# Patient Record
Sex: MALE | Race: WHITE
[De-identification: names, ages, dates, MRNs, and addresses within clinical notes are randomized per-mention and may not be internally consistent; named-entity substitution may affect disease eponyms.]

---

## 2022-11-15 ENCOUNTER — HOSPITAL ENCOUNTER (EMERGENCY)
Dept: HOSPITAL 97 - ER | Age: 27
Discharge: HOME | End: 2022-11-15
Payer: COMMERCIAL

## 2022-11-15 VITALS — TEMPERATURE: 97.9 F

## 2022-11-15 VITALS — OXYGEN SATURATION: 100 % | DIASTOLIC BLOOD PRESSURE: 76 MMHG | SYSTOLIC BLOOD PRESSURE: 127 MMHG

## 2022-11-15 DIAGNOSIS — F17.210: ICD-10-CM

## 2022-11-15 DIAGNOSIS — R07.89: Primary | ICD-10-CM

## 2022-11-15 DIAGNOSIS — R06.02: ICD-10-CM

## 2022-11-15 LAB
BUN BLD-MCNC: 15 MG/DL (ref 7–18)
GLUCOSE SERPLBLD-MCNC: 85 MG/DL (ref 74–106)
HCT VFR BLD CALC: 47.5 % (ref 39.6–49)
LYMPHOCYTES # SPEC AUTO: 1.3 K/UL (ref 0.7–4.9)
MCV RBC: 91.1 FL (ref 80–100)
PMV BLD: 9.1 FL (ref 7.6–11.3)
POTASSIUM SERPL-SCNC: 3.8 MMOL/L (ref 3.5–5.1)
RBC # BLD: 5.21 M/UL (ref 4.33–5.43)
TROPONIN I SERPL HS-MCNC: 6 PG/ML (ref ?–58.9)

## 2022-11-15 PROCEDURE — 96374 THER/PROPH/DIAG INJ IV PUSH: CPT

## 2022-11-15 PROCEDURE — 85025 COMPLETE CBC W/AUTO DIFF WBC: CPT

## 2022-11-15 PROCEDURE — 84484 ASSAY OF TROPONIN QUANT: CPT

## 2022-11-15 PROCEDURE — 36415 COLL VENOUS BLD VENIPUNCTURE: CPT

## 2022-11-15 PROCEDURE — 93005 ELECTROCARDIOGRAM TRACING: CPT

## 2022-11-15 PROCEDURE — 80048 BASIC METABOLIC PNL TOTAL CA: CPT

## 2022-11-15 PROCEDURE — 85379 FIBRIN DEGRADATION QUANT: CPT

## 2022-11-15 PROCEDURE — 99285 EMERGENCY DEPT VISIT HI MDM: CPT

## 2022-11-15 PROCEDURE — 71045 X-RAY EXAM CHEST 1 VIEW: CPT

## 2022-11-15 NOTE — ER
Nurse's Notes                                                                                     

 [facility]                                                                 

Name: Devon Bernal                                                                                

Age: 26 yrs                                                                                       

Sex: Male                                                                                         

: 1995                                                                                   

MRN: [id number]                                                                                   

Arrival Date: 11/15/2022                                                                          

Time: 10:30                                                                                       

Account#: [id number]                                                                            

Bed 9                                                                                             

Private MD:                                                                                       

Diagnosis: Chest pain, unspecified                                                                

                                                                                                  

Presentation:                                                                                     

11/15                                                                                             

10:48 Chief complaint: Patient states: Mid sternal, sharp, CP started this morning, worse     jl7 

      with movement and taking a deep breath. Coronavirus screen: At this time, the client        

      does not indicate any symptoms associated with coronavirus-19. Ebola Screen: No             

      symptoms or risks identified at this time. Initial Sepsis Screen: Does the patient meet     

      any 2 criteria? No. Patient's initial sepsis screen is negative. Does the patient have      

      a suspected source of infection? No. Patient's initial sepsis screen is negative. Risk      

      Assessment: Do you want to hurt yourself or someone else? Patient reports no desire to      

      harm self or others. Onset of symptoms was November 15, 2022.                               

10:48 Method Of Arrival: Ambulatory                                                           7 

10:48 Acuity: JAGJIT 3                                                                           jl7 

                                                                                                  

Triage Assessment:                                                                                

10:49 General: Appears in no apparent distress. uncomfortable, Behavior is calm, cooperative, jl7 

      appropriate for age. Pain: Complains of pain in mid-sternal area Pain currently is 6        

      out of 10 on a pain scale. at worst was 9 out of 10 on a pain scale. Cardiovascular:        

      Patient's skin is warm and dry.                                                             

                                                                                                  

Historical:                                                                                       

- Allergies:                                                                                      

10:49 No Known Allergies;                                                                     jl7 

- Home Meds:                                                                                      

10:49 None [Active];                                                                          jl7 

- PMHx:                                                                                           

10:49 None;                                                                                   jl7 

- PSHx:                                                                                           

10:49 None;                                                                                   jl7 

                                                                                                  

- Immunization history:: Client reports having NOT received the Covid vaccine.                    

- Social history:: Smoking status: Patient reports the use of cigarette tobacco                   

  products, denies chronic smoking, but will smoke occasionally.                                  

                                                                                                  

                                                                                                  

Screenin:10 Abuse screen: Denies threats or abuse. Nutritional screening: No deficits noted.        ap3 

      Tuberculosis screening: No symptoms or risk factors identified. Fall Risk None              

      identified.                                                                                 

                                                                                                  

Assessment:                                                                                       

13:39 General: Appears in no apparent distress. comfortable, Behavior is calm, cooperative,   ld1 

      appropriate for age. Pain: Denies pain. Neuro: Level of Consciousness is awake, alert,      

      obeys commands, Oriented to person, place, time, situation. Cardiovascular: Capillary       

      refill < 3 seconds Patient's skin is warm and dry. Respiratory: Airway is patent            

      Respiratory effort is even, unlabored. GI: Abdomen is flat, non-distended.                  

14:48 Reassessment: Pt c/o pain in back - notified ERP. See MAR for new orders.               ld1 

                                                                                                  

Vital Signs:                                                                                      

10:48  / 79; Pulse 73; Resp 17; Temp 97.9; Pulse Ox 99% ; Weight 79.38 kg; Height 6 ft. jl7 

      (182.88 cm); Pain 6/10;                                                                     

13:39  / 81; Pulse 74; Resp 18; Pulse Ox 99% on R/A;                                    ld1 

14:48  / 76; Pulse 71; Resp 18; Pulse Ox 100% on R/A; Pain 7/10;                        ld1 

10:48 Body Mass Index 23.73 (79.38 kg, 182.88 cm)                                             jl7 

                                                                                                  

ED Course:                                                                                        

10:30 Patient arrived in ED.                                                                  mr  

10:30 Jaswant Paris PA is PHCP.                                                              jmm 

10:30 Duglas Olguin MD is Attending Physician.                                                jmm 

10:48 Rosaura London, RN is Primary Nurse.                                                   iw  

10:49 Triage completed.                                                                       jl7 

10:49 Arm band placed on right wrist.                                                         jl7 

11:07 Inserted saline lock: 20 gauge in right antecubital area, using aseptic technique.      ap3 

      Blood collected.                                                                            

11:11 Placed in gown. Bed in low position. Side rails up X 1. Cardiac monitor on. Pulse ox    ap3 

      on. NIBP on.                                                                                

11:11 No provider procedures requiring assistance completed. Patient maintains SpO2           ap3 

      saturation greater than 95% on room air.                                                    

11:21 XRAY Chest (1 view) In Process Unspecified.                                             EDMS

14:47 Troponin High Sensitivity: Draw at 2 pm Sent.                                           ld1 

15:15 Thad Torres MD is Referral Physician.                                               jmm 

15:24 IV discontinued, intact, bleeding controlled, No redness/swelling at site.              ld1 

                                                                                                  

Administered Medications:                                                                         

14:47 Drug: Ketorolac 30 mg Route: IVP; Site: right antecubital;                              ld1 

                                                                                                  

                                                                                                  

Medication:                                                                                       

11:11 VIS not applicable for this client.                                                     ap3 

                                                                                                  

Outcome:                                                                                          

15:16 Discharge ordered by MD.                                                                jmm 

15:23 Discharged to home ambulatory.                                                          ld1 

15:23 Condition: stable                                                                           

15:23 Discharge instructions given to patient, Instructed on discharge instructions, follow       

      up and referral plans. medication usage, Demonstrated understanding of instructions,        

      follow-up care, medications, Prescriptions given X 3.                                       

15:24 Patient left the ED.                                                                    ld1 

                                                                                                  

Signatures:                                                                                       

Dispatcher [employer]                           EDMS                                                 

Jaswant Paris PA PA jmm Rivera, Mary                                 mr                                                   

Rosaura London RN RN iw Leal, Jahala, RN                        RN   jl7                                                  

Radha Batres RN RN   ap3                                                  

Yenifer Correia RN                     RN   ld1                                                  

                                                                                                  

**************************************************************************************************

## 2022-11-15 NOTE — EKG
Test Date:    2022-11-15               Test Time:    10:53:39

Technician:   ALP                                    

                                                     

MEASUREMENT RESULTS:                                       

Intervals:                                           

Rate:         58                                     

KS:           168                                    

QRSD:         86                                     

QT:           360                                    

QTc:          353                                    

Axis:                                                

P:            58                                     

KS:           168                                    

QRS:          85                                     

T:            74                                     

                                                     

INTERPRETIVE STATEMENTS:                                       

                                                     

Sinus bradycardia

Otherwise normal ECG

Compared to ECG 08/06/2013 10:17:35

Sinus arrhythmia no longer present

Early repolarization no longer present



Electronically Signed On 11-15-22 14:22:21 CST by Thad Torres

## 2022-11-15 NOTE — RAD REPORT
EXAM DESCRIPTION:  RAD - Chest Single View - 11/15/2022 11:19 am

 

CLINICAL HISTORY:  CHEST PAIN

Chest pain.

 

COMPARISON:  No comparisons

 

FINDINGS:  Portable technique limits examination quality.

 

The lungs are grossly clear. The heart is normal in size. No displaced fractures.

 

IMPRESSION:  No acute intrathoracic process suspected.

## 2022-11-15 NOTE — EDPHYS
Physician Documentation                                                                           

 [facility]                                                                 

Name: Devon Bernal                                                                                

Age: 26 yrs                                                                                       

Sex: Male                                                                                         

: 1995                                                                                   

MRN: [id number]                                                                                   

Arrival Date: 11/15/2022                                                                          

Time: 10:30                                                                                       

Account#: [id number]                                                                            

Bed 9                                                                                             

Private MD:                                                                                       

ED Physician Duglas Olguin                                                                         

HPI:                                                                                              

11/15                                                                                             

10:39 This 26 yrs old Male presents to ER via Ambulatory with complaints of Chest Pain.       [facility] 

10:39 The patient or guardian reports chest pain that is located primarily in the substernal  [facility] 

      area. The pain radiates to back. Associated signs and symptoms: Pertinent positives:        

      shortness of breath. The chest pain is described as aching, sharp. This is a 26 year        

      old male with no chronic medical conditions that presents to the ED with complaints of      

      substernal chest pain beginning this morning around 0500. Pain radiates into his back       

      upon movement. Patient states when he walks, pain is exacerbated. Complains of some         

      shortness of breath, denies fever. Patient states he does perform strenuous activity at     

      work. .                                                                                     

                                                                                                  

Historical:                                                                                       

- Allergies:                                                                                      

10:49 No Known Allergies;                                                                     jl7 

- Home Meds:                                                                                      

10:49 None [Active];                                                                          jl7 

- PMHx:                                                                                           

10:49 None;                                                                                   jl7 

- PSHx:                                                                                           

10:49 None;                                                                                   jl7 

                                                                                                  

- Immunization history:: Client reports having NOT received the Covid vaccine.                    

- Social history:: Smoking status: Patient reports the use of cigarette tobacco                   

  products, denies chronic smoking, but will smoke occasionally.                                  

                                                                                                  

                                                                                                  

ROS:                                                                                              

10:39 Constitutional: Negative for fever, chills, and weight loss.                            jmm 

10:39 Cardiovascular: Positive for chest pain.                                                    

10:39 Back: Positive for radiated pain.                                                           

10:39 All other systems are negative.                                                             

                                                                                                  

Exam:                                                                                             

10:39 Constitutional:  This is a well developed, well nourished patient who is awake, alert,  jmm 

      and in no acute distress. Head/Face:  atraumatic. Eyes:  EOMI, no conjunctival erythema     

      appreciated ENT:  Moist Mucus Membranes Neck:  Trachea midline, Supple Chest/axilla:        

      Normal chest wall appearance and motion.   Cardiovascular:  Regular rate and rhythm.        

      No edema appreciated Respiratory:  Normal respirations, no respiratory distress             

      appreciated Abdomen/GI:  Non distended Back:  Normal ROM Skin:  General appearance          

      color normal MS/ Extremity:  Moves all extremities, no obvious deformities appreciated,     

      no edema noted to the lower extremities  Neuro:  Awake and alert Psych:  Behavior is        

      normal, Mood is normal, Patient is cooperative and pleasant                                 

10:39 ECG was reviewed by the Attending Physician.                                                

                                                                                                  

Vital Signs:                                                                                      

10:48  / 79; Pulse 73; Resp 17; Temp 97.9; Pulse Ox 99% ; Weight 79.38 kg; Height 6 ft. jl7 

      (182.88 cm); Pain 6/10;                                                                     

13:39  / 81; Pulse 74; Resp 18; Pulse Ox 99% on R/A;                                    ld1 

14:48  / 76; Pulse 71; Resp 18; Pulse Ox 100% on R/A; Pain 7/10;                        ld1 

10:48 Body Mass Index 23.73 (79.38 kg, 182.88 cm)                                             jl7 

                                                                                                  

MDM:                                                                                              

10:39 Patient medically screened.                                                             [facility] 

15:13 Data reviewed: vital signs, nurses notes. Counseling: I had a detailed discussion with  [facility] 

      the patient and/or guardian regarding: the historical points, exam findings, and any        

      diagnostic results supporting the discharge/admit diagnosis, the need for outpatient        

      follow up, to return to the emergency department if symptoms worsen or persist or if        

      there are any questions or concerns that arise at home. ED course: Repeat troponin          

      normal. Pain is relieved. patient advised to follow up with pcp and otherwise given         

      strict return precautions. Patient understood and agrees with the plan of care. .           

                                                                                                  

11/15                                                                                             

10:40 Order name: Basic Metabolic Panel; Complete Time: 11:44                                 [facility] 

11/15                                                                                             

10:40 Order name: CBC with Diff; Complete Time: 11:18                                         [facility] 

11/15                                                                                             

10:40 Order name: D-Dimer; Complete Time: 11:21                                               [facility] 

11/15                                                                                             

10:40 Order name: Troponin HS; Complete Time: 11:44                                           [facility] 

11/15                                                                                             

10:40 Order name: XRAY Chest (1 view); Complete Time: 12:25                                   [facility] 

11/15                                                                                             

12:03 Order name: Troponin High Sensitivity: Draw at 2 pm; Complete Time: 15:12               [facility] 

11/15                                                                                             

10:40 Order name: EKG; Complete Time: 10:40                                                   [facility] 

11/15                                                                                             

10:40 Order name: Cardiac monitoring; Complete Time: 11:07                                    [facility] 

11/15                                                                                             

10:40 Order name: EKG - Nurse/Tech; Complete Time: 10:55                                      [facility] 

11/15                                                                                             

10:40 Order name: IV Saline Lock; Complete Time: 11:07                                        [facility] 

11/15                                                                                             

10:40 Order name: Labs collected and sent; Complete Time: 11:                               [facility] 

11/15                                                                                             

10:40 Order name: O2 Per Protocol; Complete Time: 10:55                                       [facility] 

11/15                                                                                             

10:40 Order name: O2 Sat Monitoring; Complete Time: 11:                                     [facility] 

                                                                                                  

ECG:                                                                                              

10:39 Rate is 58 beats/min. Rhythm is regular, Sinus bradycardia. QRS Axis is Normal. [place]      jmm 

      interval is normal. QRS interval is normal. QT interval is normal. No Q waves. T waves      

      are Normal. No ST changes noted. Reviewed by me.                                            

                                                                                                  

Administered Medications:                                                                         

14:47 Drug: Ketorolac 30 mg Route: IVP; Site: right antecubital;                              ld1 

                                                                                                  

                                                                                                  

Disposition:                                                                                      

16:10 Co-signature as Attending Physician, Duglas Olguin MD.                                    rn  

                                                                                                  

Disposition Summary:                                                                              

11/15/22 15:16                                                                                    

Discharge Ordered                                                                                 

      Location: Home                                                                          [facility] 

      Condition: Stable                                                                       jm 

      Diagnosis                                                                                   

        - Chest pain, unspecified                                                             [facility] 

      Followup:                                                                               [facility] 

        - With: Thad Torres MD                                                                 

        - When: 2 - 3 days                                                                         

        - Reason: Recheck today's complaints, Continuance of care, Re-evaluation by your           

      physician                                                                                   

      Discharge Instructions:                                                                     

        - Discharge Summary Sheet                                                             [facility] 

        - Nonspecific Chest Pain, Adult                                                       [facility] 

      Forms:                                                                                      

        - Medication Reconciliation Form                                                      [facility] 

        - Thank You Letter                                                                    [facility] 

        - Antibiotic Education                                                                [facility] 

        - Prescription Opioid Use                                                             [facility] 

        - Work release form                                                                   ld1 

      Prescriptions:                                                                              

        - Pepcid 20 mg Oral Tablet                                                                 

            - take 1 tablet by ORAL route every 12 hours for 10 days; 20 tablet; Refills: 0,  [facility] 

      Product Selection Permitted                                                                 

        - Medrol ([place]) 4 mg Oral Tablets, Dose Pack                                                

            - take 1 tablet by ORAL route as directed - follow package instructions; 1        [facility] 

      packet; Refills: 0, Product Selection Permitted                                             

        - orphenadrine citrate 100 mg Oral Tablet Sustained Release                                

            - take 1 tablet by ORAL route 2 times per day As needed; 20 tablet; Refills: 0,   [facility] 

      Product Selection Permitted                                                                 

Signatures:                                                                                       

Dispatcher [employer]                           Jaswant Mello PA PA   [facility]                                                  

Duglas Olguin MD MD rn Leal, Jahala, RN                        RN   jl7                                                  

Yenifer Correia RN                     RN   ld1                                                  

                                                                                                  

**************************************************************************************************